# Patient Record
Sex: MALE | Race: WHITE | Employment: FULL TIME
[De-identification: names, ages, dates, MRNs, and addresses within clinical notes are randomized per-mention and may not be internally consistent; named-entity substitution may affect disease eponyms.]

---

## 2017-09-24 ENCOUNTER — HEALTH MAINTENANCE LETTER (OUTPATIENT)
Age: 22
End: 2017-09-24

## 2019-12-20 ENCOUNTER — OFFICE VISIT (OUTPATIENT)
Dept: FAMILY MEDICINE | Facility: CLINIC | Age: 24
End: 2019-12-20

## 2019-12-20 VITALS
DIASTOLIC BLOOD PRESSURE: 82 MMHG | WEIGHT: 148.1 LBS | HEIGHT: 68 IN | OXYGEN SATURATION: 99 % | RESPIRATION RATE: 20 BRPM | BODY MASS INDEX: 22.45 KG/M2 | HEART RATE: 92 BPM | TEMPERATURE: 98.2 F | SYSTOLIC BLOOD PRESSURE: 127 MMHG

## 2019-12-20 DIAGNOSIS — J32.9 RHINOSINUSITIS: Primary | ICD-10-CM

## 2019-12-20 PROCEDURE — 99203 OFFICE O/P NEW LOW 30 MIN: CPT | Performed by: PHYSICIAN ASSISTANT

## 2019-12-20 SDOH — HEALTH STABILITY: MENTAL HEALTH: HOW OFTEN DO YOU HAVE A DRINK CONTAINING ALCOHOL?: 2-4 TIMES A MONTH

## 2019-12-20 ASSESSMENT — MIFFLIN-ST. JEOR: SCORE: 1646.28

## 2019-12-20 NOTE — PROGRESS NOTES
SUBJECTIVE:  Benja is a 24 year old male who presents to urgent care with 2 days of nasal congestion and rhinorrhea. He has mild nasal pressure.  He reports mild cough and PND. He denies sinus pain, teeth pain, fever, chills, body aches, fatigue, ear pain, sore throat, wheeze, SOB, chest pain, N/V/D abdominal pain. He has been using an over the counter nasal spray. He says he does have  Hx of sinus infections    Chief Complaint   Patient presents with     Sinus Problem     running nose, is being 2 days      Cough     ROS: as stated in HPI, otherwise negative    No past medical history on file.   Social History     Socioeconomic History     Marital status: Single     Spouse name: Not on file     Number of children: Not on file     Years of education: Not on file     Highest education level: Not on file   Occupational History     Not on file   Social Needs     Financial resource strain: Not on file     Food insecurity:     Worry: Not on file     Inability: Not on file     Transportation needs:     Medical: Not on file     Non-medical: Not on file   Tobacco Use     Smoking status: Never Smoker     Smokeless tobacco: Never Used   Substance and Sexual Activity     Alcohol use: Yes     Frequency: 2-4 times a month     Drug use: Not on file     Sexual activity: Not on file   Lifestyle     Physical activity:     Days per week: Not on file     Minutes per session: Not on file     Stress: Not on file   Relationships     Social connections:     Talks on phone: Not on file     Gets together: Not on file     Attends [employer] service: Not on file     Active member of club or organization: Not on file     Attends meetings of clubs or organizations: Not on file     Relationship status: Not on file     Intimate partner violence:     Fear of current or ex partner: Not on file     Emotionally abused: Not on file     Physically abused: Not on file     Forced sexual activity: Not on file   Other Topics Concern     Not on file   Social  "History Narrative     Not on file        No current outpatient medications on file.     OBJECTIVE:  /82   Pulse 92   Temp 98.2  F (36.8  C) (Oral)   Resp 20   Ht 1.727 m (5' 8\")   Wt 67.2 kg (148 lb 1.6 oz)   SpO2 99%   BMI 22.52 kg/m     GENERAL APPEARANCE: Appears well and in no acute distress  HEENT: HEAD: ATNC  EYES: Conjunctiva clear, EOMI  EARS: TM's pearly bilaterally with intact landmarks bilaterally. No effusion or erythema  NOSE: . Nares partially occluded with clear matter, mucosa non erythematous, turbinates non swollen, L maxillary sinuses mildly tender  THROAT: mild Posterior oropharynx erythema, tonsils 0+ bilaterally, no exudate, uvula midline, non occluded  NECK: Supple, non tender, no cervical adenopathy  LUNGS: No respiratory distress, clear lung sounds in all fields, no wheezes, rales, crackles or rhonchi   CV: RRR, no murmurs, rubs or gallops, no cyanosis  NUERO: AOx3, normal mentation  PSYCH: normal mood and affect    No results found for any visits on 12/20/19.     ASSESSMENT/PLAN:  Benja was seen today for sinus problem and cough.    Diagnoses and all orders for this visit:    Rhinosinusitis      1) Exam and history are consistent with mild viral URI/rhinosinusitis. Continue with good sinus hygiene. Mucinex while staying well hydrated, OTC cough/cold product of patient's choice PRN and fluids and rest, flonase. Afrin, ibuprofen.  We discussed signs and symptoms that would warrant further evaluation from a health care provider. The plan of care was discussed.They understand and agree with the course of treatments. A printed summary was given including instructions and medications.    Eric Ramires PA-C    "

## 2019-12-20 NOTE — PATIENT INSTRUCTIONS
Flonase or fluticasone is what we think you have. Continue using this daily.  Consider afrin nasal spray.   Ibuprofen for pain and inflammation.  Mucin ex or Psuedofed to help nasal congestion and drainage.   Patient Education     Self-Care for Sinusitis     Drinking plenty of water can help sinuses drain.   Sinusitis can often be managed with self-care. Self-care can keep sinuses moist and make you feel more comfortable. Remember to follow your doctor's instructions closely. This can make a big difference in getting your sinus problem under control.  Drink fluids  Drinking extra fluids helps thin your mucus. This lets it drain from your sinuses more easily. Have a glass of water every hour or two. A humidifier helps in much the same way. Fluids can also offset the drying effects of certain medicines. If you use a humidifier, follow the product maker's instructions on how to use it. Clean it on a regular schedule.  Use saltwater rinses  Rinses help keep your sinuses and nose moist. Mix a teaspoon of salt in 8 ounces of fresh, warm water. Use a bulb syringe to gently squirt the water into your nose a few times a day. You can also buy ready-made saline nasal sprays.  Apply hot or cold packs  Applying heat to the area surrounding your sinuses may make you feel more comfortable. Use a hot water bottle or a hand towel dipped in hot water. Some people also find ice packs effective for relieving pain.  Medicines  Your doctor may prescribe medications to help treat your sinusitis. If you have an infection, antibiotics can help clear it up. If you are prescribed antibiotics, take all pills on schedule until they are gone, even if you feel better. Decongestants help relieve swelling. Use decongestant sprays for short periods only under the direction of your doctor. If you have allergies, your doctor may prescribe medications to help relieve them.   Date Last Reviewed: 10/1/2016    2101-8509 [employer] [address]  [place] [address]. All rights reserved. This information is not intended as a substitute for professional medical care. Always follow your healthcare professional's instructions.

## 2025-04-26 ENCOUNTER — OFFICE VISIT (OUTPATIENT)
Dept: URGENT CARE | Facility: URGENT CARE | Age: 30
End: 2025-04-26

## 2025-04-26 ENCOUNTER — ANCILLARY PROCEDURE (OUTPATIENT)
Dept: GENERAL RADIOLOGY | Facility: CLINIC | Age: 30
End: 2025-04-26
Attending: FAMILY MEDICINE

## 2025-04-26 VITALS
RESPIRATION RATE: 20 BRPM | OXYGEN SATURATION: 100 % | BODY MASS INDEX: 18.43 KG/M2 | WEIGHT: 121.6 LBS | HEART RATE: 75 BPM | DIASTOLIC BLOOD PRESSURE: 74 MMHG | SYSTOLIC BLOOD PRESSURE: 120 MMHG | TEMPERATURE: 98.4 F | HEIGHT: 68 IN

## 2025-04-26 DIAGNOSIS — M54.9 MID BACK PAIN: ICD-10-CM

## 2025-04-26 DIAGNOSIS — M54.9 MID BACK PAIN: Primary | ICD-10-CM

## 2025-04-26 DIAGNOSIS — S39.012A BACK STRAIN, INITIAL ENCOUNTER: ICD-10-CM

## 2025-04-26 DIAGNOSIS — M41.9 SCOLIOSIS OF THORACIC SPINE, UNSPECIFIED SCOLIOSIS TYPE: ICD-10-CM

## 2025-04-26 PROCEDURE — 3078F DIAST BP <80 MM HG: CPT | Performed by: FAMILY MEDICINE

## 2025-04-26 PROCEDURE — 3074F SYST BP LT 130 MM HG: CPT | Performed by: FAMILY MEDICINE

## 2025-04-26 PROCEDURE — 72070 X-RAY EXAM THORAC SPINE 2VWS: CPT | Mod: TC | Performed by: RADIOLOGY

## 2025-04-26 PROCEDURE — 99203 OFFICE O/P NEW LOW 30 MIN: CPT | Performed by: FAMILY MEDICINE

## 2025-04-26 RX ORDER — CYCLOBENZAPRINE HCL 5 MG
5 TABLET ORAL 3 TIMES DAILY PRN
Qty: 16 TABLET | Refills: 0 | Status: SHIPPED | OUTPATIENT
Start: 2025-04-26

## 2025-04-26 NOTE — PROGRESS NOTES
Urgent Care Clinic Visit    Chief Complaint   Patient presents with    Back Pain     Pop in back, tingling in legs and arms at work               4/26/2025    12:19 PM   Additional Questions   Roomed by griselda gray   Accompanied by self           A couple weeks ago had pain from left ear to left shoulder      Shoulder muscles feeling tight    Felt a pop in back when pushing something two days ago    Went to break room    Back to work, did some lighter work    Right arm felt a little weaker     Yesterday felt another pop, not as bad     Dull pain the rest of today    [employer] work    Lots of lifting and pushing    Patient active in general     No serious neck or back problems in past    Minimal pain right now, mid back     Breathing fine    ROS    Physical Exam      Full physical not done     Mentation and affect are fine    No tremor of speech or extremity    Patient has no point tenderness over cervical, thoracic, or lumbar spine    Mild discomfort just left of midline in mid thoracic spine  No point tenderness over neck, ribs, pelvis    Good sensation and strength in all 4 extremities    Good radial pulses    No edema pretibial     Range of motion of back is good; when he flexes spine ( bends  over ) the right thoracic back area is higher elevation than the left    No costovertebral angle tenderness    Xrays of thoracic spine ordered; vertebra looks fine but he has some curvature/ scoliosis    He has good range of motion of both shoulders but some pain on active motion especially left    When he holds left arm out across chest and I press down on wrist against resistance, some  pain ; less so on right    ASSESSMENT / PLAN:  (M54.9) Mid back pain  (primary encounter diagnosis)  Comment: overall exam is reassuring.   No need for advanced imaging  Plan: XR Thoracic Spine 2 Views             (S39.012A) Back strain, initial encounter  Comment: trial of occasional muscle relaxer, not to use at work or when driving.   Warned of sedation.  Over the counter meds prn.  Work on stretching.   Did do letter; light duty for next week.   Plan: cyclobenzaprine (FLEXERIL) 5 MG tablet          Follow up if symptoms not resolving    (M41.9) Scoliosis of thoracic spine, unspecified scoliosis type  Comment: discussed in detail.  Not severe degree of curvature  Plan: advised long term to work on strengthening the back       I reviewed the patient's medications, allergies, medical history, family history, and social history.    Bert Weathers MD

## 2025-04-26 NOTE — PATIENT INSTRUCTIONS
Muscle relaxer as needed    Can use tylenol/ ibuprofen as needed also[    Follow up if not improving    Long term advise strengthening the back muscles

## 2025-04-26 NOTE — LETTER
2025    To whom it may concern:    Benja Schwartz cammie 89 was seen here in urgent care today.  Advise he have a 10 pound lifting/ pulling/ pushing restriction for the next week.             Sincerely,                   Bert Weathers MD